# Patient Record
Sex: MALE | HISPANIC OR LATINO | Employment: FULL TIME
[De-identification: names, ages, dates, MRNs, and addresses within clinical notes are randomized per-mention and may not be internally consistent; named-entity substitution may affect disease eponyms.]

---

## 2020-03-02 ENCOUNTER — HEALTH MAINTENANCE LETTER (OUTPATIENT)
Age: 40
End: 2020-03-02

## 2020-12-20 ENCOUNTER — HEALTH MAINTENANCE LETTER (OUTPATIENT)
Age: 40
End: 2020-12-20

## 2021-04-24 ENCOUNTER — HEALTH MAINTENANCE LETTER (OUTPATIENT)
Age: 41
End: 2021-04-24

## 2021-10-03 ENCOUNTER — HEALTH MAINTENANCE LETTER (OUTPATIENT)
Age: 41
End: 2021-10-03

## 2022-05-15 ENCOUNTER — HEALTH MAINTENANCE LETTER (OUTPATIENT)
Age: 42
End: 2022-05-15

## 2022-09-10 ENCOUNTER — HEALTH MAINTENANCE LETTER (OUTPATIENT)
Age: 42
End: 2022-09-10

## 2023-06-03 ENCOUNTER — HEALTH MAINTENANCE LETTER (OUTPATIENT)
Age: 43
End: 2023-06-03

## 2024-01-31 ENCOUNTER — HOSPITAL ENCOUNTER (EMERGENCY)
Facility: CLINIC | Age: 44
Discharge: HOME | End: 2024-01-31
Admitting: PHYSICIAN ASSISTANT
Payer: COMMERCIAL

## 2024-01-31 VITALS
TEMPERATURE: 97.6 F | SYSTOLIC BLOOD PRESSURE: 133 MMHG | BODY MASS INDEX: 26.66 KG/M2 | DIASTOLIC BLOOD PRESSURE: 73 MMHG | HEIGHT: 69 IN | WEIGHT: 180 LBS | RESPIRATION RATE: 16 BRPM | HEART RATE: 61 BPM | OXYGEN SATURATION: 99 %

## 2024-01-31 DIAGNOSIS — S09.93XA INJURY OF FOREHEAD, INITIAL ENCOUNTER: ICD-10-CM

## 2024-01-31 PROCEDURE — 99282 EMERGENCY DEPT VISIT SF MDM: CPT

## 2024-01-31 RX ORDER — TETRACAINE HYDROCHLORIDE 5 MG/ML
1-2 SOLUTION OPHTHALMIC ONCE
Status: DISCONTINUED | OUTPATIENT
Start: 2024-01-31 | End: 2024-01-31 | Stop reason: HOSPADM

## 2024-01-31 NOTE — ED PROVIDER NOTES
EMERGENCY DEPARTMENT ENCOUNTER   NAME: Zhen Prabhakar ; AGE: 44 year old male ; YOB: 1980 ; MRN: [id number] ; PCP: Parviz Nunez     Evaluation Date & Time: No admission date for patient encounter.    ED Provider: Leslee Pastor PA-C    CHIEF COMPLAINT     Head Injury      FINAL ASSESSMENT       ICD-10-CM    1. Injury of forehead, initial encounter  S09.93XA           ED COURSE, MEDICAL DECISION MAKING, PLAN     ED course   3:53 PM: Evaluated patient. Performed physical exam. Plan for eye pressures and likely discharge.   4:06 PM: Eye pressures normal. Plan for discharge.  _____________________________________________________________________    Zhen is a 44-year-old male presenting with a right-sided forehead injury that occurred about 1 week ago with associated trouble concentrating, pain above the right eye, and intermittent blurred vision.  Struck his head on the corner of a metal table.  Did not have loss of consciousness.  Not on a blood thinner.  Has been doing well, but yesterday was rubbing the area and felt a crackling and shortly after developed some pain just above his right eye.  The blurring in his right eyes been a chronic issue for many years.    On exam patient is nontoxic-appearing.  No acute distress.  He does have a scant amount of swelling just above his right eyebrow.  There is a tiny bruise to the right forehead surrounded by a small amount of pallor.  There is then some streaking faint erythema that goes inferior and medially.  The area is tender to the touch.  No fluctuance or induration.  Neurologically patient is intact.  Eye pressures were completed and those are within normal limits.  Visual acuity is intact and visual confrontation intact. He has no neck pain or back pain.  He is vitally normal.    Concern for concussion, head bleed, keratitis, optic neuritis.     Ocular pressures were obtained here and both eyes were averaged to be 17.    Overall, signs and  symptoms appear to be consistent with a soft tissue injury to the right side of the forehead with some swelling that has settled just above the right eye.  Possible mild concussion.   He describes a crackling/popping sensation last night with pushing on the area which was likely some of the fluid displacing followed by gravity pulling downward causing that swelling just above his right eye.  The intermittent right eye blurring has been going on for quite a long time and I do not feel that it is emergent.  He should follow-up with his eye doctor.    No red flag signs or symptoms present to suggest an acutely serious life-threatening condition.  Patient has a normal neurological exam.  He has not had any nausea or vomiting.  He has not had any dizziness.  No substantial headache.  Patient has normal eye pressures bilaterally.  Visual acuity is intact.  Visual confrontation intact. The eyeball itself is actually not tender.  He has no difficulty with range of motion of his eyes.  No pain with eyeball movement.  No photophobia.    Recommended supportive cares by alternating Tylenol and ibuprofen and using ice.  Recommended eye rest for possible mild concussion.  If symptoms or not improving in the next 1 to 2 weeks, he should follow-up with PCP for recheck.    Indications for more emergent reevaluation back in the ER discussed with patient.    *All pertinent lab & imaging studies independently reviewed. (See chart for details)   *Discussed the results of all the tests and plan with patient and family/guardians.   *All questions were answered.   *The patient and/or family/guardian acknowledged understanding and was agreeable with the care plan.      HISTORY OF PRESENT ILLNESS   Patient information was obtained from: Patient    Use of Intrepreter: N/A     Zhen Prabhakar is a 44 year old male with no pertinent PMH who presents to the ED by means of walk in for evaluation of a right forehead injury and intermittent right  eye blurring.     States he struck the right side of his forehead on the corner of a metal table a week ago. Had immediate pain to the area, but it slowly improved. He has been having some intermittent blurring in his right eye, but notes he has noticed this happening on and off over the last 4 years or so.   He states yesterday he was kind of rubbing the area on his forehead and felt a crackling/popping type sensation and then started noticing pain just above his right eye shortly after.    He states he injured the right temple area about 4 years ago and had lingering pain and eye symptoms from that for quite a long time. Not sure if the right eye blurring is residual from that.     He does note he has had a harder time concentrating since the injury.    He has not had any dizziness or lightheadedness.  He has not had any nausea or vomiting.  No severe headache.  No neck pain.    He did not lose consciousness when the injury occurred.  He is not on a blood thinner.    No other concerns.      MEDICAL HISTORY     Past Medical History:   Diagnosis Date    Panic attack        Past Surgical History:   Procedure Laterality Date    HAND SURGERY      INGUINAL HERNIA REPAIR Left 4/29/2016    Procedure: LEFT INGUINAL HERNIA REPAIR WITH MESH;  Surgeon: Gerald Flores MD;  Location: [facility];  Service:     SOFT TISSUE SURGERY  2008    MRSA in right groin in abcess and removed       No family history on file.    Social History     Tobacco Use    Smoking status: Every Day     Packs/day: .25     Types: Cigarettes   Substance Use Topics    Alcohol use: Yes     Alcohol/week: 2.0 standard drinks of alcohol     Comment: Alcoholic Drinks/day: per week    Drug use: No       ALPRAZOLAM  ciprofloxacin (CIPRO) 500 MG tablet  doxycycline (VIBRAMYCIN) 100 MG capsule  naproxen (NAPROSYN) 500 MG tablet  Sertraline HCl (ZOLOFT PO)          PHYSICAL EXAM     First Vitals:  Patient Vitals for the past 24 hrs:   BP Temp Temp src  "Pulse Resp SpO2 Height Weight   01/31/24 1455 133/73 97.6  F (36.4  C) Temporal 61 16 99 % 1.753 m (5' 9\") 81.6 kg (180 lb)         PHYSICAL EXAM:   Constitutional: No acute distress.  Neuro: Awake and alert. Cranial nerves II-XII intact. Speech is fluid and articulate. No facial droop. No pronator drift.  strength strong and equal b/l. Upper and lower extremity strength strong and equal b/l. Finger-to-nose intact. Rapid alternating movements intact. Sensations intact.   Psych: Calm and cooperative.  Head: Small 3mm bruise to the right forehead surrounded by about half a centimeter of pallor. Trailing inferior and medially from this is some splotchy faint erythema that ends just above the eyebrow. The area just above the left eyebrow is scantly swollen as compared to the left.   Eyes: Vision confrontation testing intact with no deficits. PERRL. EOMI. Conjunctivae clear. See above under head.    Neck: FROM. No pain over the cervical spine or paraspinal musculature.   Cardio: Regular rate. Adequate perfusion to extremities.   Pulmonary: Oxygenating well on RA. No labored breathing.    Upper extremities: Moves freely.   Lower extremities: Moves freely.   Skin: See above. All other skin is natural color, warm, dry, and intact.       RESULTS     LAB:  All pertinent labs reviewed and interpreted  Labs Ordered and Resulted from Time of ED Arrival to Time of ED Departure - No data to display    RADIOLOGY:  No orders to display       ECG:    N/A      PROCEDURES     PROCEDURE: Intraocular Pressure Measurement   DEVICE USED: Tonopen   INDICATIONS: Right eye pain   PROCEDURE PROVIDER: Leslee Pastor PA-C   SITE: Eyes   MEDICATION: 0.5% Tetracaine ophthalmic drops were applied to right eye   NOTE: Administered 2 drops of above solution, rapid anesthesia achieved. Using standard technique, performed Tonopen exam, which showed intraocular pressure(s) of;  17 mmHg in Right eye  17 mmHg in Left eye     COMPLICATIONS: Patient " tolerated procedure well, without complication                History:  Supplemental history from: Documented in chart  External Record(s) reviewed: Documented in chart    Work Up:  Chart documentation includes differentials considered and any EKGs or imaging independently interpreted by provider, where specified.  In additional to work up documented, I considered the following work up: Documented in chart, if applicable.    External consultation:  Discussion of management with another provider: Documented in chart, if applicable    Complicating factors:  Care impacted by chronic illness: N/A  Care affected by social determinants of health: N/A    Disposition considerations: Discharge. No recommendations on prescription strength medication(s). See documentation for any additional details.    FINAL IMPRESSION:    ICD-10-CM    1. Injury of forehead, initial encounter  S09.93XA             MEDICATIONS GIVEN IN THE EMERGENCY DEPARTMENT:  Medications   tetracaine (PONTOCAINE) 0.5 % ophthalmic solution 1-2 drop (has no administration in time range)         NEW PRESCRIPTIONS STARTED AT TODAY'S ED VISIT:  New Prescriptions    No medications on file              Some or all of this documentation has been completed using dictation software and mild grammatical errors may be present. Please contact me with any concerns regarding this.       Leslee Pastor PA-C  Emergency Medicine   [facility] EMERGENCY ROOM       Leslee Pastor PA-C  01/31/24 [id number]

## 2024-01-31 NOTE — ED TRIAGE NOTES
"Pt hit his head on the corner of a metal table about 1 week ago. Last night developed right eye pain and thought he heard a \"cracking\" noise when he was rubbing his head. He is also c/o of blurred vision in the right eye that he has had off an on for a the last 4 years after he hit his right temple on a metal pole.         "

## 2024-07-07 ENCOUNTER — HEALTH MAINTENANCE LETTER (OUTPATIENT)
Age: 44
End: 2024-07-07

## 2025-07-13 ENCOUNTER — HEALTH MAINTENANCE LETTER (OUTPATIENT)
Age: 45
End: 2025-07-13